# Patient Record
Sex: FEMALE | Employment: UNEMPLOYED | URBAN - METROPOLITAN AREA
[De-identification: names, ages, dates, MRNs, and addresses within clinical notes are randomized per-mention and may not be internally consistent; named-entity substitution may affect disease eponyms.]

---

## 2023-07-05 ENCOUNTER — OFFICE VISIT (OUTPATIENT)
Dept: OPTOMETRY | Facility: CLINIC | Age: 78
End: 2023-07-05
Payer: COMMERCIAL

## 2023-07-05 DIAGNOSIS — H52.4 PRESBYOPIA: Primary | ICD-10-CM

## 2023-07-05 DIAGNOSIS — H54.61 DECREASED VISION OF RIGHT EYE: ICD-10-CM

## 2023-07-05 DIAGNOSIS — Z96.1 PSEUDOPHAKIA OF BOTH EYES: ICD-10-CM

## 2023-07-05 PROCEDURE — 92015 DETERMINE REFRACTIVE STATE: CPT

## 2023-07-05 PROCEDURE — 92004 COMPRE OPH EXAM NEW PT 1/>: CPT

## 2023-07-05 RX ORDER — AMLODIPINE BESYLATE 10 MG/1
10 TABLET ORAL DAILY
COMMUNITY
Start: 2022-08-02 | End: 2023-07-28

## 2023-07-05 ASSESSMENT — REFRACTION_MANIFEST
OS_AXIS: 117
METHOD_AUTOREFRACTION: 1
OS_SPHERE: +0.00
OD_AXIS: 090
OS_AXIS: 110
OD_SPHERE: -3.25
OS_ADD: +2.50
OS_CYLINDER: +0.50
OD_ADD: +2.50
OD_CYLINDER: +1.50
OS_CYLINDER: +0.75
OS_SPHERE: +0.00
OD_AXIS: 061
OD_CYLINDER: +6.25
OD_SPHERE: -1.50

## 2023-07-05 ASSESSMENT — VISUAL ACUITY
OD_PH_SC: 20/70
OS_SC: 20/200
OS_SC: 20/40
OD_SC: 20/200
METHOD: SNELLEN - LINEAR
OD_SC: 20/250

## 2023-07-05 ASSESSMENT — TONOMETRY
OS_IOP_MMHG: 23
IOP_METHOD: TONOPEN
OD_IOP_MMHG: 21

## 2023-07-05 ASSESSMENT — KERATOMETRY
OS_AXISANGLE2_DEGREES: 11
OD_AXISANGLE_DEGREES: 62
OS_K2POWER_DIOPTERS: 45.25
OS_K1POWER_DIOPTERS: 44.50
OD_K1POWER_DIOPTERS: 41.25
OD_K2POWER_DIOPTERS: 47.00
OS_AXISANGLE_DEGREES: 101
OD_AXISANGLE2_DEGREES: 152

## 2023-07-05 ASSESSMENT — CONF VISUAL FIELD: COMMENTS: PLEASE RECHECK

## 2023-07-05 ASSESSMENT — EXTERNAL EXAM - RIGHT EYE: OD_EXAM: NORMAL

## 2023-07-05 ASSESSMENT — REFRACTION_WEARINGRX
OD_SPHERE: -1.50
OD_CYLINDER: +1.50
OS_SPHERE: +0.00
OD_AXIS: 085

## 2023-07-05 ASSESSMENT — CUP TO DISC RATIO
OS_RATIO: 0.55
OD_RATIO: 0.55

## 2023-07-05 ASSESSMENT — SLIT LAMP EXAM - LIDS
COMMENTS: DERMATOCHALASIS
COMMENTS: DERMATOCHALASIS

## 2023-07-05 ASSESSMENT — EXTERNAL EXAM - LEFT EYE: OS_EXAM: NORMAL

## 2023-07-05 NOTE — PROGRESS NOTES
Chief Complaint   Patient presents with     Annual Eye Exam      Accompanied by PCA    [place]- but [place] of what surgery it was.     Last Eye Exam: unknown  Dilated Previously: Yes    What are you currently using to see?  Lost glasses       Distance Vision Acuity: Noticed gradual change in both eyes    Near Vision Acuity: Not satisfied     Eye Comfort: sees black spots   Do you use eye drops? : Yes:     Latonia Burch - Optometric Assistant          Medical, surgical and family histories reviewed and updated 7/5/2023.       OBJECTIVE: See Ophthalmology exam    ASSESSMENT:    ICD-10-CM    1. Presbyopia  H52.4 REFRACTION     EYE EXAM (SIMPLE-NONBILLABLE)      2. Pseudophakia of both eyes  Z96.1 EYE EXAM (SIMPLE-NONBILLABLE)          PLAN:     Patient Instructions   1. Presbyopia: Updated bifocal glasses prescription for full-time wear.  Monitor annually.  2. Pseudophakia, each eye: BCVA soft 20/50 right eye, 20/40 left eye.  Per 2021 [facility] post-op note, previously refracted to 20/30 right eye with q/o mild amblyopia right eye.  Unfortunately, patient is a poor historian and we were unable to reach a  today.  Patient and PCA educated on condition.  Recommended baseline mac/ONH OCT to rule-out other etiologies of vision changes.  Referral completed.       Dominga Arredondo, OD

## 2023-07-05 NOTE — LETTER
7/5/2023         RE: Forest Andrade  [address]  [address]  [facility] [address]        Dear Colleague,    Thank you for referring your patient, Forest Andrade, to the [facility]. Please see a copy of my visit note below.    Chief Complaint   Patient presents with     Annual Eye Exam      Accompanied by PCA    Patient [place]- but unsure of what surgery it was.     Last Eye Exam: unknown  Dilated Previously: Yes    What are you currently using to see?  Lost glasses       Distance Vision Acuity: Noticed gradual change in both eyes    Near Vision Acuity: Not satisfied     Eye Comfort: sees black spots   Do you use eye drops? : Yes:     Latonia Burch - Optometric Assistant          Medical, surgical and family histories reviewed and updated 7/5/2023.       OBJECTIVE: See Ophthalmology exam    ASSESSMENT:    ICD-10-CM    1. Presbyopia  H52.4 REFRACTION     EYE EXAM (SIMPLE-NONBILLABLE)      2. Pseudophakia of both eyes  Z96.1 EYE EXAM (SIMPLE-NONBILLABLE)          PLAN:     Patient Instructions   1. Presbyopia: Updated bifocal glasses prescription for full-time wear.  Monitor annually.  2. Pseudophakia, each eye: BCVA soft 20/50 right eye, 20/40 left eye.  Per 2021 [facility] post-op note, previously refracted to 20/30 right eye with q/o mild amblyopia right eye.  Unfortunately, patient is a poor historian and we were unable to reach a  today.  Patient and PCA educated on condition.  Recommended baseline mac/ONH OCT to rule-out other etiologies of vision changes.  Referral completed.       Dominga Arredondo OD      Again, thank you for allowing me to participate in the care of your patient.        Sincerely,        Dominga Arredondo, OD

## 2023-07-05 NOTE — PATIENT INSTRUCTIONS
Presbyopia: Updated bifocal glasses prescription for full-time wear.  Monitor annually.  Pseudophakia, each eye: BCVA soft 20/50 right eye, 20/40 left eye.  Per 2021 [facility] post-op note, previously refracted to 20/30 right eye with q/o mild amblyopia right eye.  Unfortunately, patient is a poor historian and we were unable to reach a  today.  Patient and PCA educated on condition.  Recommended baseline mac/ONH OCT to rule-out other etiologies of vision changes.  Referral completed.

## 2023-07-18 ENCOUNTER — APPOINTMENT (OUTPATIENT)
Dept: OPTOMETRY | Facility: CLINIC | Age: 78
End: 2023-07-18
Payer: COMMERCIAL

## 2023-07-18 PROCEDURE — 92341 FIT SPECTACLES BIFOCAL: CPT

## 2024-02-21 ENCOUNTER — APPOINTMENT (OUTPATIENT)
Dept: OPTOMETRY | Facility: CLINIC | Age: 79
End: 2024-02-21
Payer: COMMERCIAL

## 2024-02-21 PROCEDURE — 92341 FIT SPECTACLES BIFOCAL: CPT

## 2024-04-08 DIAGNOSIS — M25.562 LEFT KNEE PAIN, UNSPECIFIED CHRONICITY: Primary | ICD-10-CM
